# Patient Record
Sex: FEMALE | Race: WHITE
[De-identification: names, ages, dates, MRNs, and addresses within clinical notes are randomized per-mention and may not be internally consistent; named-entity substitution may affect disease eponyms.]

---

## 2020-10-10 ENCOUNTER — HOSPITAL ENCOUNTER (EMERGENCY)
Dept: HOSPITAL 47 - EC | Age: 50
Discharge: HOME | End: 2020-10-10
Payer: COMMERCIAL

## 2020-10-10 VITALS — HEART RATE: 72 BPM | SYSTOLIC BLOOD PRESSURE: 156 MMHG | DIASTOLIC BLOOD PRESSURE: 91 MMHG

## 2020-10-10 VITALS — TEMPERATURE: 97.9 F | RESPIRATION RATE: 16 BRPM
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
## 2020-10-10 DIAGNOSIS — S52.592A: Primary | ICD-10-CM

## 2020-10-10 PROCEDURE — 73100 X-RAY EXAM OF WRIST: CPT

## 2020-10-10 PROCEDURE — 25605 CLTX DST RDL FX/EPHYS SEP W/: CPT

## 2020-10-10 PROCEDURE — 73090 X-RAY EXAM OF FOREARM: CPT

## 2020-10-10 PROCEDURE — 73110 X-RAY EXAM OF WRIST: CPT

## 2020-10-10 PROCEDURE — 99283 EMERGENCY DEPT VISIT LOW MDM: CPT

## 2020-10-10 NOTE — XR
EXAMINATION TYPE: XR wrist limited LT

 

DATE OF EXAM: 10/10/2020

 

COMPARISON: NONE

 

HISTORY: Post reduction

 

TECHNIQUE: 2 views

 

FINDINGS: There is impacted transverse fracture distal radius. There is improved position of the frag
ments compared to recent exam.

 

IMPRESSION:

Improved position of the radius fracture. There is 5 mm posterior displacement of the distal major fr
agment. Nondisplaced ulnar styloid process fracture unchanged.

## 2020-10-10 NOTE — XR
EXAMINATION TYPE: XR forearm LT

 

DATE OF EXAM: 10/10/2020

 

COMPARISON: NONE

 

HISTORY: Wrist pain

 

TECHNIQUE: 2 views

 

FINDINGS: There is impacted transverse fracture of the distal radius 1.5 cm from the wrist joint. The
re is anterior angulation. There is 1 cm posterior displacement on the lateral view. There is intact 
carpal bones. Carpal bones are not well visualized. Elbow joint appears anatomic.

 

IMPRESSION: Impacted displaced fracture of the distal radius.

## 2020-10-10 NOTE — XR
EXAMINATION TYPE: XR wrist complete LT

 

DATE OF EXAM: 10/10/2020

 

COMPARISON: NONE

 

HISTORY: Wrist pain

 

TECHNIQUE: 2 views

 

FINDINGS: There is impacted transverse fracture of the distal radial metaphysis 1.5 cm from the wrist
 joint. There is fracture ulnar styloid process. There is anterior mild angulation at the radius frac
ture site. There is 1 cm posterior displacement of the distal fragments. There is comminution. There 
is no dislocation at the wrist joint. There is some deformity at the scaphoid trapezium joint which c
ould relate to an old injury. Scaphoid is intact.

 

IMPRESSION: Impacted displaced fracture distal radius. Nondisplaced ulnar styloid process fracture.

## 2020-10-10 NOTE — ED
Upper Extremity HPI





- General


Chief Complaint: Extremity Injury, Upper


Stated Complaint: fall/arm injury


Time Seen by Provider: 10/10/20 17:40


Source: patient


Mode of arrival: ambulatory


Limitations: no limitations





- History of Present Illness


Initial Comments: 





Patient is a 50-year-old female presenting to the emergency department the chief

complaint of wrist pain.  Patient states she injured her left wrist and went to 

an urgent care.  Patient states they gave her 60 mg of Toradol which improved 

her pain.  They also obtained an x-ray which revealed a fracture with some 

displacement advised to come to emergency department for evaluation.  She denies

any numbness or tingling is still able to move her fingers.  Patient states her 

pain is under control and does not want anymore.  Patient came in with a short 

arm splint already in place.





- Related Data


                                    Allergies











Allergy/AdvReac Type Severity Reaction Status Date / Time


 


No Known Allergies Allergy   Verified 10/10/20 17:37














Review of Systems


ROS Statement: 


Those systems with pertinent positive or pertinent negative responses have been 

documented in the HPI.





ROS Other: All systems not noted in ROS Statement are negative.





Past Medical History


Past Medical History: No Reported History


History of Any Multi-Drug Resistant Organisms: None Reported


Past Surgical History: No Surgical Hx Reported


Past Psychological History: No Psychological Hx Reported


Smoking Status: Never smoker


Past Alcohol Use History: Occasional


Past Drug Use History: None Reported





General Exam


Limitations: no limitations


General appearance: alert, in no apparent distress


Head exam: Present: atraumatic, normocephalic, normal inspection


Eye exam: Present: normal appearance, PERRL, EOMI


Pupils: Present: normal accommodation


ENT exam: Present: normal exam, normal oropharynx, mucous membranes moist, TM's 

normal bilaterally, normal external ear exam


Neck exam: Present: normal inspection, full ROM.  Absent: tenderness


Respiratory exam: Present: normal lung sounds bilaterally.  Absent: respiratory 

distress, wheezes, rales


Cardiovascular Exam: Present: regular rate, normal rhythm, normal heart sounds


Extremities exam: Present: tenderness (Localized tenderness to the left wrist), 

normal capillary refill, joint swelling (Left wrist), other (+2 ulnar and radial

pulses bilateral.  Sensation intact to proprioception and pain in the left upper

she may.).  Absent: normal inspection (Obvious bony deformity noted on the left 

wrist.  Small region of ecchymosis 2.), full ROM (Limited range of motion of the

left wrist due to pain.), pedal edema, calf tenderness


Back exam: Present: normal inspection, full ROM.  Absent: tenderness, CVA 

tenderness (R), CVA tenderness (L)


Neurological exam: Present: alert, oriented X3


Psychiatric exam: Present: normal affect, normal mood


Skin exam: Present: warm, dry, intact, normal color





Course


                                   Vital Signs











  10/10/20





  17:33


 


Temperature 97.9 F


 


Pulse Rate 68


 


Respiratory 16





Rate 


 


Blood Pressure 146/93


 


O2 Sat by Pulse 100





Oximetry 














Procedures





- Nerve Block


Consent Obtained: verbal consent


Local Anesthetic Used: Lidocaine 1%


Amount of anesthesia used: 10


Side: left


Nerve Blocks: hematoma block


Procedure Successful: Yes


Complications: none


Patient Tolerated Procedure: well, no complications





- Orthopedic Fracture Reduction


  ** Fracture #1


Consent Obtained: verbal consent


Side: left


Fracture Reduction Location: radius (Distal radius left)


Analgesia: hematoma block


Technique: direct manipulation


Post Reduction X-rays Demonstrate: acceptable reduction


Post-Reduction Neuro Exam: intact


Post-Reduction Vascular Exam: intact


Splint Applied: Yes (Volar)


Patient Tolerated Procedure: well





- Orthopedic Splinting/Casting


  ** Injury #1


Side: left


Upper Extremity Immobilizer: volar splint, Ace wrap, synthetic pre-padded splint





Medical Decision Making





- Medical Decision Making





Patient is a 50-year-old female presenting to emergency department with left 

wrist pain.  On physical examination, there appears to be a bony deformity at 

the left wrist.  X-ray reveals an impacted and displaced distal radial fracture.

 Patient was offered analgesia, she declined.  Hematoma block was successfully 

performed.  Reduction performed on the spot and volar splint was applied.  

Patient was advised to follow with orthopedics.  She is otherwise 

neurovascularly intact in the left upper extremity.  She will be discharged with

Tylenol 3 and advised not to drive or operate heavy machinery when taking the 

medication.  Return parameters were thoroughly discussed the patient was 

understanding and agreeable.  Case discussed with physician.





Disposition


Clinical Impression: 


 Distal radius fracture, left, Left wrist injury





Disposition: HOME SELF-CARE


Condition: Stable


Instructions (If sedation given, give patient instructions):  Wrist Fracture in 

Adults (ED)


Additional Instructions: 


Follow-up with orthopedic specialist.  Do not drive or operate heavy machinery 

when taking Tylenol 3.  Keep the arm elevated and alternate between Tylenol and 

Motrin for pain control.  Return to emergency department if symptoms worsen.


Is patient prescribed a controlled substance at d/c from ED?: No


Referrals: 


None,Stated [Primary Care Provider] - 1-2 days


Michele Miller DO [Doctor of Osteopathic Medicine] - 1-2 days


Time of Disposition: 19:38